# Patient Record
Sex: FEMALE | Race: WHITE
[De-identification: names, ages, dates, MRNs, and addresses within clinical notes are randomized per-mention and may not be internally consistent; named-entity substitution may affect disease eponyms.]

---
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
## 2018-12-07 ENCOUNTER — HOSPITAL ENCOUNTER (EMERGENCY)
Age: 18
LOS: 1 days | Discharge: LEFT BEFORE DISCHARGE | End: 2018-12-08

## 2018-12-07 ENCOUNTER — HOSPITAL ENCOUNTER (EMERGENCY)
Dept: HOSPITAL 91 - FTE | Age: 18
LOS: 1 days | Discharge: LEFT BEFORE DISCHARGE | End: 2018-12-08
Payer: SELF-PAY

## 2018-12-07 DIAGNOSIS — Z53.21: Primary | ICD-10-CM
